# Patient Record
Sex: FEMALE | Race: OTHER | NOT HISPANIC OR LATINO | URBAN - METROPOLITAN AREA
[De-identification: names, ages, dates, MRNs, and addresses within clinical notes are randomized per-mention and may not be internally consistent; named-entity substitution may affect disease eponyms.]

---

## 2022-03-02 NOTE — ASU PATIENT PROFILE, ADULT - NSICDXPASTSURGICALHX_GEN_ALL_CORE_FT
PAST SURGICAL HISTORY:  H/O  section x5    History of endometriosis x1    History of surgery d/e x2

## 2022-03-02 NOTE — ASU PATIENT PROFILE, ADULT - FALL HARM RISK - HARM RISK INTERVENTIONS

## 2022-03-03 ENCOUNTER — INPATIENT (INPATIENT)
Facility: HOSPITAL | Age: 42
LOS: 0 days | Discharge: HOME | DRG: 743 | End: 2022-03-04
Attending: OBSTETRICS & GYNECOLOGY | Admitting: STUDENT IN AN ORGANIZED HEALTH CARE EDUCATION/TRAINING PROGRAM
Payer: SELF-PAY

## 2022-03-03 VITALS
HEIGHT: 67 IN | RESPIRATION RATE: 18 BRPM | DIASTOLIC BLOOD PRESSURE: 76 MMHG | HEART RATE: 93 BPM | SYSTOLIC BLOOD PRESSURE: 120 MMHG | OXYGEN SATURATION: 100 % | WEIGHT: 169.98 LBS | TEMPERATURE: 98 F

## 2022-03-03 DIAGNOSIS — Z98.891 HISTORY OF UTERINE SCAR FROM PREVIOUS SURGERY: Chronic | ICD-10-CM

## 2022-03-03 DIAGNOSIS — Z98.890 OTHER SPECIFIED POSTPROCEDURAL STATES: Chronic | ICD-10-CM

## 2022-03-03 DIAGNOSIS — Z87.42 PERSONAL HISTORY OF OTHER DISEASES OF THE FEMALE GENITAL TRACT: Chronic | ICD-10-CM

## 2022-03-03 LAB
BLD GP AB SCN SERPL QL: NEGATIVE — SIGNIFICANT CHANGE UP
GLUCOSE BLDC GLUCOMTR-MCNC: 101 MG/DL — HIGH (ref 70–99)
RH IG SCN BLD-IMP: POSITIVE — SIGNIFICANT CHANGE UP

## 2022-03-03 PROCEDURE — 88307 TISSUE EXAM BY PATHOLOGIST: CPT | Mod: 26

## 2022-03-03 PROCEDURE — 88305 TISSUE EXAM BY PATHOLOGIST: CPT | Mod: 26

## 2022-03-03 RX ORDER — ACETAMINOPHEN 500 MG
1000 TABLET ORAL ONCE
Refills: 0 | Status: COMPLETED | OUTPATIENT
Start: 2022-03-03 | End: 2022-03-03

## 2022-03-03 RX ORDER — GABAPENTIN 400 MG/1
600 CAPSULE ORAL ONCE
Refills: 0 | Status: COMPLETED | OUTPATIENT
Start: 2022-03-03 | End: 2022-03-03

## 2022-03-03 RX ORDER — IBUPROFEN 200 MG
1 TABLET ORAL
Qty: 0 | Refills: 0 | DISCHARGE

## 2022-03-03 RX ORDER — HYDROMORPHONE HYDROCHLORIDE 2 MG/ML
0.5 INJECTION INTRAMUSCULAR; INTRAVENOUS; SUBCUTANEOUS
Refills: 0 | Status: DISCONTINUED | OUTPATIENT
Start: 2022-03-03 | End: 2022-03-04

## 2022-03-03 RX ORDER — PANTOPRAZOLE SODIUM 20 MG/1
40 TABLET, DELAYED RELEASE ORAL
Refills: 0 | Status: DISCONTINUED | OUTPATIENT
Start: 2022-03-03 | End: 2022-03-04

## 2022-03-03 RX ORDER — SODIUM CHLORIDE 9 MG/ML
1000 INJECTION, SOLUTION INTRAVENOUS
Refills: 0 | Status: DISCONTINUED | OUTPATIENT
Start: 2022-03-03 | End: 2022-03-04

## 2022-03-03 RX ORDER — CELECOXIB 200 MG/1
400 CAPSULE ORAL ONCE
Refills: 0 | Status: COMPLETED | OUTPATIENT
Start: 2022-03-03 | End: 2022-03-03

## 2022-03-03 RX ORDER — ACETAMINOPHEN 500 MG
650 TABLET ORAL EVERY 6 HOURS
Refills: 0 | Status: DISCONTINUED | OUTPATIENT
Start: 2022-03-03 | End: 2022-03-04

## 2022-03-03 RX ORDER — SIMETHICONE 80 MG/1
80 TABLET, CHEWABLE ORAL EVERY 8 HOURS
Refills: 0 | Status: DISCONTINUED | OUTPATIENT
Start: 2022-03-03 | End: 2022-03-04

## 2022-03-03 RX ORDER — KETOROLAC TROMETHAMINE 30 MG/ML
30 SYRINGE (ML) INJECTION EVERY 6 HOURS
Refills: 0 | Status: DISCONTINUED | OUTPATIENT
Start: 2022-03-03 | End: 2022-03-04

## 2022-03-03 RX ORDER — HYDROMORPHONE HYDROCHLORIDE 2 MG/ML
0.5 INJECTION INTRAMUSCULAR; INTRAVENOUS; SUBCUTANEOUS EVERY 6 HOURS
Refills: 0 | Status: DISCONTINUED | OUTPATIENT
Start: 2022-03-03 | End: 2022-03-04

## 2022-03-03 RX ORDER — PHENAZOPYRIDINE HCL 100 MG
200 TABLET ORAL ONCE
Refills: 0 | Status: COMPLETED | OUTPATIENT
Start: 2022-03-03 | End: 2022-03-03

## 2022-03-03 RX ORDER — ONDANSETRON 8 MG/1
8 TABLET, FILM COATED ORAL EVERY 6 HOURS
Refills: 0 | Status: DISCONTINUED | OUTPATIENT
Start: 2022-03-03 | End: 2022-03-04

## 2022-03-03 RX ADMIN — CELECOXIB 400 MILLIGRAM(S): 200 CAPSULE ORAL at 11:06

## 2022-03-03 RX ADMIN — Medication 1000 MILLIGRAM(S): at 20:00

## 2022-03-03 RX ADMIN — SIMETHICONE 80 MILLIGRAM(S): 80 TABLET, CHEWABLE ORAL at 21:17

## 2022-03-03 RX ADMIN — HYDROMORPHONE HYDROCHLORIDE 0.5 MILLIGRAM(S): 2 INJECTION INTRAMUSCULAR; INTRAVENOUS; SUBCUTANEOUS at 21:51

## 2022-03-03 RX ADMIN — GABAPENTIN 600 MILLIGRAM(S): 400 CAPSULE ORAL at 11:05

## 2022-03-03 RX ADMIN — Medication 200 MILLIGRAM(S): at 11:05

## 2022-03-03 RX ADMIN — Medication 400 MILLIGRAM(S): at 19:41

## 2022-03-03 RX ADMIN — HYDROMORPHONE HYDROCHLORIDE 0.5 MILLIGRAM(S): 2 INJECTION INTRAMUSCULAR; INTRAVENOUS; SUBCUTANEOUS at 20:00

## 2022-03-03 RX ADMIN — SODIUM CHLORIDE 125 MILLILITER(S): 9 INJECTION, SOLUTION INTRAVENOUS at 19:40

## 2022-03-03 RX ADMIN — HYDROMORPHONE HYDROCHLORIDE 0.5 MILLIGRAM(S): 2 INJECTION INTRAMUSCULAR; INTRAVENOUS; SUBCUTANEOUS at 19:38

## 2022-03-03 RX ADMIN — HYDROMORPHONE HYDROCHLORIDE 0.5 MILLIGRAM(S): 2 INJECTION INTRAMUSCULAR; INTRAVENOUS; SUBCUTANEOUS at 21:17

## 2022-03-03 RX ADMIN — Medication 1000 MILLIGRAM(S): at 11:04

## 2022-03-03 NOTE — H&P ADULT - ASSESSMENT
40yo P5 here for scheduled TLH, BS, endo excision.    To OR for surgery  Admit postop for postop management

## 2022-03-03 NOTE — PACU DISCHARGE NOTE - COMMENTS
pt met PACU discharge criteria, report given to Melinda SOTO pt transferred to [facility].

## 2022-03-03 NOTE — BRIEF OPERATIVE NOTE - NSICDXBRIEFPOSTOP_GEN_ALL_CORE_FT
POST-OP DIAGNOSIS:  Endometriosis 03-Mar-2022 19:03:20  Thu Boothe  Adenomyosis 03-Mar-2022 19:03:27  Thu Boothe

## 2022-03-03 NOTE — H&P ADULT - HISTORY OF PRESENT ILLNESS
40yo with dysmenorrhea, BL ovulatory pain, bladder pain/urgency/frequency, gas/bloating/cramping/constipation/diarrhea.  Dyspareunea, R leg pain, R back pain with sciatica, R shoulder pain.  Here for endo excision and TLH, BS.  Pt currently denies fever, chills, chest pain, SOB, abdominal pain, nausea, vomiting, vaginal bleeding.    OB/GYN Hx: 5 x cs (last in 2016 with BTL), endometriosis  PMHx: denies  SHx: ls endo fulguration 2004  NKDA    PHYSICAL EXAM:   Vital Signs Last 24 Hrs  T(C): 36.9 (03 Mar 2022 10:37), Max: 36.9 (03 Mar 2022 10:37)  T(F): 98.4 (03 Mar 2022 10:37), Max: 98.4 (03 Mar 2022 10:37)  HR: 93 (03 Mar 2022 10:37) (93 - 93)  BP: 120/76 (03 Mar 2022 10:37) (120/76 - 120/76)  BP(mean): --  RR: 18 (03 Mar 2022 10:37) (18 - 18)  SpO2: 100% (03 Mar 2022 10:37) (100% - 100%)    **************************  Constitutional: Alert & Oriented x3, No acute distress  Extremities: no calf tenderness or swelling                    RADIOLOGY & ADDITIONAL STUDIES:

## 2022-03-03 NOTE — PROGRESS NOTE ADULT - ASSESSMENT
41y y.o. F POD#0 s/p l/s endo excision, TLH, cystoscopy admitted for pain control and postoperative monitoring.    Plan:  Neuro: Tylenol/toradol ATC, dilaudid PRN severe pain  CV: VSS.  Resp: On RA. Encourage ISS  GI/FEN: Regular diet as tolerated. Zofran/Reglan PRN nausea. Simethicone PRN gas pain.  : F/u TOV  ID: Afebrile  DVT ppx: SCDs, ambulate as tolerated  Activity: Out of bed      Marilee Gambino MD PGY2

## 2022-03-03 NOTE — BRIEF OPERATIVE NOTE - NSICDXBRIEFPREOP_GEN_ALL_CORE_FT
PRE-OP DIAGNOSIS:  Pain pelvic 03-Mar-2022 19:02:57  Thu Boothe  Abnormal uterine bleeding 03-Mar-2022 19:03:04  Thu Boothe

## 2022-03-03 NOTE — BRIEF OPERATIVE NOTE - NSICDXBRIEFPROCEDURE_GEN_ALL_CORE_FT
PROCEDURES:  Laparoscopic total hysterectomy with cystoscopy 03-Mar-2022 19:02:40  Thu Boothe  Removal, endometriosis, laparoscopic 03-Mar-2022 19:02:47  Thu Boothe

## 2022-03-03 NOTE — ASU DISCHARGE PLAN (ADULT/PEDIATRIC) - NS MD DC FALL RISK RISK
For information on Fall & Injury Prevention, visit: [email or web address] OR  [email or web address] OR  https://www.cdc.gov/steadi/patient.html

## 2022-03-03 NOTE — ASU DISCHARGE PLAN (ADULT/PEDIATRIC) - CARE PROVIDER_API CALL
Amanda Patel (DO)  [facility] Obstetrics and Gynecology  [address] [id number]  Phone: [phone]  Fax: [phone]  Follow Up Time:

## 2022-03-04 VITALS
TEMPERATURE: 98 F | SYSTOLIC BLOOD PRESSURE: 129 MMHG | DIASTOLIC BLOOD PRESSURE: 71 MMHG | RESPIRATION RATE: 17 BRPM | OXYGEN SATURATION: 99 % | HEART RATE: 90 BPM

## 2022-03-04 LAB
BASOPHILS # BLD AUTO: 0.01 K/UL — SIGNIFICANT CHANGE UP (ref 0–0.2)
BASOPHILS NFR BLD AUTO: 0.1 % — SIGNIFICANT CHANGE UP (ref 0–2)
EOSINOPHIL # BLD AUTO: 0 K/UL — SIGNIFICANT CHANGE UP (ref 0–0.5)
EOSINOPHIL NFR BLD AUTO: 0 % — SIGNIFICANT CHANGE UP (ref 0–6)
HCT VFR BLD CALC: 29.7 % — LOW (ref 34.5–45)
HGB BLD-MCNC: 8.6 G/DL — LOW (ref 11.5–15.5)
IMM GRANULOCYTES NFR BLD AUTO: 0.6 % — SIGNIFICANT CHANGE UP (ref 0–1.5)
LYMPHOCYTES # BLD AUTO: 0.78 K/UL — LOW (ref 1–3.3)
LYMPHOCYTES # BLD AUTO: 8.7 % — LOW (ref 13–44)
MCHC RBC-ENTMCNC: 22.1 PG — LOW (ref 27–34)
MCHC RBC-ENTMCNC: 29 GM/DL — LOW (ref 32–36)
MCV RBC AUTO: 76.3 FL — LOW (ref 80–100)
MONOCYTES # BLD AUTO: 0.48 K/UL — SIGNIFICANT CHANGE UP (ref 0–0.9)
MONOCYTES NFR BLD AUTO: 5.4 % — SIGNIFICANT CHANGE UP (ref 2–14)
NEUTROPHILS # BLD AUTO: 7.65 K/UL — HIGH (ref 1.8–7.4)
NEUTROPHILS NFR BLD AUTO: 85.2 % — HIGH (ref 43–77)
NRBC # BLD: 0 /100 WBCS — SIGNIFICANT CHANGE UP (ref 0–0)
PLATELET # BLD AUTO: 281 K/UL — SIGNIFICANT CHANGE UP (ref 150–400)
RBC # BLD: 3.89 M/UL — SIGNIFICANT CHANGE UP (ref 3.8–5.2)
RBC # FLD: 15.4 % — HIGH (ref 10.3–14.5)
WBC # BLD: 8.97 K/UL — SIGNIFICANT CHANGE UP (ref 3.8–10.5)
WBC # FLD AUTO: 8.97 K/UL — SIGNIFICANT CHANGE UP (ref 3.8–10.5)

## 2022-03-04 PROCEDURE — 86850 RBC ANTIBODY SCREEN: CPT

## 2022-03-04 PROCEDURE — 86901 BLOOD TYPING SEROLOGIC RH(D): CPT

## 2022-03-04 PROCEDURE — 88305 TISSUE EXAM BY PATHOLOGIST: CPT

## 2022-03-04 PROCEDURE — 86900 BLOOD TYPING SEROLOGIC ABO: CPT

## 2022-03-04 PROCEDURE — 82962 GLUCOSE BLOOD TEST: CPT

## 2022-03-04 PROCEDURE — 85025 COMPLETE CBC W/AUTO DIFF WBC: CPT

## 2022-03-04 PROCEDURE — 36415 COLL VENOUS BLD VENIPUNCTURE: CPT

## 2022-03-04 PROCEDURE — 88307 TISSUE EXAM BY PATHOLOGIST: CPT

## 2022-03-04 RX ADMIN — PANTOPRAZOLE SODIUM 40 MILLIGRAM(S): 20 TABLET, DELAYED RELEASE ORAL at 07:50

## 2022-03-04 RX ADMIN — Medication 30 MILLIGRAM(S): at 00:56

## 2022-03-04 RX ADMIN — Medication 30 MILLIGRAM(S): at 05:51

## 2022-03-04 RX ADMIN — Medication 650 MILLIGRAM(S): at 00:55

## 2022-03-04 RX ADMIN — Medication 650 MILLIGRAM(S): at 01:30

## 2022-03-04 RX ADMIN — Medication 30 MILLIGRAM(S): at 05:36

## 2022-03-04 RX ADMIN — SIMETHICONE 80 MILLIGRAM(S): 80 TABLET, CHEWABLE ORAL at 05:36

## 2022-03-04 RX ADMIN — Medication 650 MILLIGRAM(S): at 08:20

## 2022-03-04 RX ADMIN — Medication 650 MILLIGRAM(S): at 07:50

## 2022-03-04 RX ADMIN — Medication 30 MILLIGRAM(S): at 01:30

## 2022-03-04 NOTE — PROGRESS NOTE ADULT - SUBJECTIVE AND OBJECTIVE BOX
GYN Post Op Check     Patient seen at bedside.  Pain controlled. Not yet drinking. Not OOB yet. TOV pending.    Denies CP, SOB, fever, chills, nausea, vomiting.    Vital Signs Last 24 Hrs  T(C): 36.3 (03 Mar 2022 19:15), Max: 36.9 (03 Mar 2022 10:37)  T(F): 97.3 (03 Mar 2022 19:15), Max: 98.4 (03 Mar 2022 10:37)  HR: 86 (03 Mar 2022 22:00) (73 - 98)  BP: 118/67 (03 Mar 2022 22:00) (110/69 - 126/73)  BP(mean): 89 (03 Mar 2022 22:00) (83 - 94)  RR: 16 (03 Mar 2022 22:00) (14 - 20)  SpO2: 100% (03 Mar 2022 22:00) (100% - 100%)    Physical Exam:  Gen: Well-appearing. NAD.  Resp: Breathing comfortably on RA. Lungs CTAB.  Abd: Soft, appropriately tender, mildly distended, decreased BS, no rebound, no guarding. Dressings C/D/I.  Ext: SCDs in place. No calf tenderness.    I&O's Summary    03 Mar 2022 07:01  -  03 Mar 2022 22:18  --------------------------------------------------------  IN: 1500 mL / OUT: 100 mL / NET: 1400 mL      MEDICATIONS  (STANDING):  acetaminophen     Tablet .. 650 milliGRAM(s) Oral every 6 hours  ketorolac   Injectable 30 milliGRAM(s) IV Push every 6 hours  lactated ringers. 1000 milliLiter(s) (125 mL/Hr) IV Continuous <Continuous>  pantoprazole    Tablet 40 milliGRAM(s) Oral before breakfast  simethicone 80 milliGRAM(s) Chew every 8 hours    MEDICATIONS  (PRN):  HYDROmorphone  Injectable 0.5 milliGRAM(s) IV Push every 15 minutes PRN Severe Pain (7 - 10)  HYDROmorphone  Injectable 0.5 milliGRAM(s) IV Push every 6 hours PRN Severe Pain (7 - 10)    Allergies    No Known Allergies    Intolerances        LABS:                  
Patient evaluated at bedside. She has no complaints. She reports pain is well controlled with medications. She denies HA, dizziness, SOB, CP, palpitations, n/v.    T(C): 37 (03-04-22 @ 05:32), Max: 37 (03-04-22 @ 05:32)  HR: 92 (03-04-22 @ 05:32) (73 - 103)  BP: 136/79 (03-04-22 @ 05:32) (110/69 - 136/79)  RR: 16 (03-04-22 @ 05:32) (14 - 20)  SpO2: 99% (03-04-22 @ 05:32) (98% - 100%)    GA: nad  Resp: normal respiratory effort  Abd: +BS, soft, NT/ND, no rebound or guarding, incisions c/d/i  Extrem: SCDs in place, no LE edema       03-03 @ 07:01  -  03-04 @ 07:00  --------------------------------------------------------  IN: 2125 mL / OUT: 525 mL / NET: 1600 mL

## 2022-03-04 NOTE — PROGRESS NOTE ADULT - ASSESSMENT
41y y.o. F POD#1 s/p l/s endo excision, TLH, cystoscopy admitted for pain control and postoperative monitoring.    Plan:  Neuro: Tylenol/toradol ATC, dilaudid PRN severe pain  CV: VSS.  Resp: On RA. Encourage ISS  GI/FEN: Regular diet as tolerated. Zofran/Reglan PRN nausea. Simethicone PRN gas pain.  : voiding  ID: Afebrile  DVT ppx: SCDs, ambulate as tolerated  DC today

## 2022-03-04 NOTE — DISCHARGE NOTE NURSING/CASE MANAGEMENT/SOCIAL WORK - NSDCPEFALRISK_GEN_ALL_CORE
For information on Fall & Injury Prevention, visit: [email or web address] OR  [email or web address] OR  https://www.cdc.gov/steadi/patient.html

## 2022-03-04 NOTE — DISCHARGE NOTE PROVIDER - CARE PROVIDER_API CALL
Basilia Stewart)  Obstetrics and Gynecology  [address] [place] [address]  Phone: [phone]  Fax: [phone]  Follow Up Time:

## 2022-03-04 NOTE — DISCHARGE NOTE NURSING/CASE MANAGEMENT/SOCIAL WORK - PATIENT PORTAL LINK FT
You can access the FollowMyHealth Patient Portal offered by [facility] by registering at the following website: [email or web address]. By joining [email or web address] FollowMyHealth portal, you will also be able to view your health information using other applications (apps) compatible with our system.

## 2022-03-04 NOTE — DISCHARGE NOTE PROVIDER - HOSPITAL COURSE
42yo s/p TLH, endometriosis excision.  Admitted for postoperative monitoring and now meedting all postoperative milestones.  Stable for discharge home.

## 2022-03-15 LAB — SURGICAL PATHOLOGY STUDY: SIGNIFICANT CHANGE UP

## 2022-03-16 DIAGNOSIS — N73.6 FEMALE PELVIC PERITONEAL ADHESIONS (POSTINFECTIVE): ICD-10-CM

## 2022-03-16 DIAGNOSIS — N80.0 ENDOMETRIOSIS OF UTERUS: ICD-10-CM

## 2022-03-16 DIAGNOSIS — N83.202 UNSPECIFIED OVARIAN CYST, LEFT SIDE: ICD-10-CM

## 2022-03-16 DIAGNOSIS — R10.2 PELVIC AND PERINEAL PAIN: ICD-10-CM

## 2023-09-13 NOTE — DISCHARGE NOTE PROVIDER - PROVIDER RX CONTACT NUMBER
Instructions: This plan will send the code FBSE to the PM system.  DO NOT or CHANGE the price. Detail Level: Simple Price (Do Not Change): 0.00 [phone]

## 2023-10-23 NOTE — BRIEF OPERATIVE NOTE - PRIMARY SURGEON
PSR called and spoke with Roal informing them that Electrophysiology (EP) referral was received, triage has been started, and they will receive a call within 3 days to schedule. Patient was informed that if any additional testing is needed for the consult appointment the nursing staff will reach out to them via their patient portal. If patient portal not set up, pt will be called.     PSR Referral Questions:  Have you been seen by a cardiologist/electrophysiologist outside of [facility]? No  • If yes, please confirm where (no) and request outside records using the EP request records fax.     Do you have a device, such as a loop recorder, pacemaker or defibrillator? No   • If yes, please clarify type of device and which company by checking the boxes that apply. One from each category.       Nurses please enter In Clinic Device Check Order    TYPE:     COMPANY:   [] Loop recorder   [] [employer]  [] Pacemaker    [] [employer]  [] Defibrillator   [] [employer]       [] [employer]    [x] NO DEVICE    Will need to request records for a previous remote transmission if pt has a device & seen out of [facility].     Consult appointment will be scheduled at, per patient's request: [facility]    Other Notes for Nursing Staff: n/a    Encounter routed to nursing staff to review.    Jorge
